# Patient Record
Sex: FEMALE | Race: BLACK OR AFRICAN AMERICAN | Employment: FULL TIME | URBAN - METROPOLITAN AREA
[De-identification: names, ages, dates, MRNs, and addresses within clinical notes are randomized per-mention and may not be internally consistent; named-entity substitution may affect disease eponyms.]

---

## 2017-02-01 PROBLEM — Z98.890 STATUS POST INDUCTION OF LABOR: Status: ACTIVE | Noted: 2017-02-01

## 2017-02-02 ENCOUNTER — ANESTHESIA EVENT (OUTPATIENT)
Dept: LABOR AND DELIVERY | Age: 37
End: 2017-02-02
Payer: COMMERCIAL

## 2017-02-02 ENCOUNTER — ANESTHESIA (OUTPATIENT)
Dept: LABOR AND DELIVERY | Age: 37
End: 2017-02-02
Payer: COMMERCIAL

## 2017-02-02 PROCEDURE — 74011000250 HC RX REV CODE- 250

## 2017-02-02 PROCEDURE — 74011250636 HC RX REV CODE- 250/636

## 2017-02-02 PROCEDURE — 74011250636 HC RX REV CODE- 250/636: Performed by: OBSTETRICS & GYNECOLOGY

## 2017-02-02 RX ORDER — FENTANYL CITRATE 50 UG/ML
INJECTION, SOLUTION INTRAMUSCULAR; INTRAVENOUS AS NEEDED
Status: DISCONTINUED | OUTPATIENT
Start: 2017-02-02 | End: 2017-02-02 | Stop reason: HOSPADM

## 2017-02-02 RX ORDER — KETOROLAC TROMETHAMINE 30 MG/ML
INJECTION, SOLUTION INTRAMUSCULAR; INTRAVENOUS AS NEEDED
Status: DISCONTINUED | OUTPATIENT
Start: 2017-02-02 | End: 2017-02-02 | Stop reason: HOSPADM

## 2017-02-02 RX ORDER — MORPHINE SULFATE 0.5 MG/ML
INJECTION, SOLUTION EPIDURAL; INTRATHECAL; INTRAVENOUS AS NEEDED
Status: DISCONTINUED | OUTPATIENT
Start: 2017-02-02 | End: 2017-02-02 | Stop reason: HOSPADM

## 2017-02-02 RX ORDER — SODIUM CHLORIDE, SODIUM LACTATE, POTASSIUM CHLORIDE, CALCIUM CHLORIDE 600; 310; 30; 20 MG/100ML; MG/100ML; MG/100ML; MG/100ML
INJECTION, SOLUTION INTRAVENOUS
Status: DISCONTINUED | OUTPATIENT
Start: 2017-02-02 | End: 2017-02-02 | Stop reason: HOSPADM

## 2017-02-02 RX ORDER — OXYTOCIN/RINGER'S LACTATE 20/1000 ML
PLASTIC BAG, INJECTION (ML) INTRAVENOUS
Status: DISCONTINUED | OUTPATIENT
Start: 2017-02-02 | End: 2017-02-02 | Stop reason: HOSPADM

## 2017-02-02 RX ORDER — LIDOCAINE HYDROCHLORIDE AND EPINEPHRINE 15; 5 MG/ML; UG/ML
INJECTION, SOLUTION EPIDURAL AS NEEDED
Status: DISCONTINUED | OUTPATIENT
Start: 2017-02-02 | End: 2017-02-02 | Stop reason: HOSPADM

## 2017-02-02 RX ORDER — BUPIVACAINE HYDROCHLORIDE 2.5 MG/ML
INJECTION, SOLUTION EPIDURAL; INFILTRATION; INTRACAUDAL AS NEEDED
Status: DISCONTINUED | OUTPATIENT
Start: 2017-02-02 | End: 2017-02-02 | Stop reason: HOSPADM

## 2017-02-02 RX ORDER — LIDOCAINE HYDROCHLORIDE AND EPINEPHRINE 20; 5 MG/ML; UG/ML
INJECTION, SOLUTION EPIDURAL; INFILTRATION; INTRACAUDAL; PERINEURAL AS NEEDED
Status: DISCONTINUED | OUTPATIENT
Start: 2017-02-02 | End: 2017-02-02 | Stop reason: HOSPADM

## 2017-02-02 RX ADMIN — LIDOCAINE HYDROCHLORIDE AND EPINEPHRINE 5 ML: 20; 5 INJECTION, SOLUTION EPIDURAL; INFILTRATION; INTRACAUDAL; PERINEURAL at 17:49

## 2017-02-02 RX ADMIN — SODIUM CHLORIDE, SODIUM LACTATE, POTASSIUM CHLORIDE, CALCIUM CHLORIDE: 600; 310; 30; 20 INJECTION, SOLUTION INTRAVENOUS at 18:10

## 2017-02-02 RX ADMIN — MORPHINE SULFATE 4 MG: 0.5 INJECTION, SOLUTION EPIDURAL; INTRATHECAL; INTRAVENOUS at 18:31

## 2017-02-02 RX ADMIN — FENTANYL CITRATE 100 MCG: 50 INJECTION, SOLUTION INTRAMUSCULAR; INTRAVENOUS at 07:15

## 2017-02-02 RX ADMIN — BUPIVACAINE HYDROCHLORIDE 2 ML: 2.5 INJECTION, SOLUTION EPIDURAL; INFILTRATION; INTRACAUDAL at 07:18

## 2017-02-02 RX ADMIN — BUPIVACAINE HYDROCHLORIDE 4 ML: 2.5 INJECTION, SOLUTION EPIDURAL; INFILTRATION; INTRACAUDAL at 07:16

## 2017-02-02 RX ADMIN — ONDANSETRON 4 MG: 2 INJECTION INTRAMUSCULAR; INTRAVENOUS at 18:16

## 2017-02-02 RX ADMIN — LIDOCAINE HYDROCHLORIDE AND EPINEPHRINE 5 ML: 20; 5 INJECTION, SOLUTION EPIDURAL; INFILTRATION; INTRACAUDAL; PERINEURAL at 17:51

## 2017-02-02 RX ADMIN — LIDOCAINE HYDROCHLORIDE AND EPINEPHRINE 3 ML: 15; 5 INJECTION, SOLUTION EPIDURAL at 07:14

## 2017-02-02 RX ADMIN — Medication: at 18:30

## 2017-02-02 RX ADMIN — KETOROLAC TROMETHAMINE 30 MG: 30 INJECTION, SOLUTION INTRAMUSCULAR; INTRAVENOUS at 18:57

## 2017-02-02 RX ADMIN — LIDOCAINE HYDROCHLORIDE AND EPINEPHRINE 5 ML: 20; 5 INJECTION, SOLUTION EPIDURAL; INFILTRATION; INTRACAUDAL; PERINEURAL at 17:53

## 2017-02-02 RX ADMIN — MORPHINE SULFATE 1 MG: 0.5 INJECTION, SOLUTION EPIDURAL; INTRATHECAL; INTRAVENOUS at 18:33

## 2017-02-02 NOTE — ANESTHESIA PROCEDURE NOTES
Epidural Block    Start time: 2/2/2017 7:07 AM  End time: 2/2/2017 7:18 AM  Performed by: Noelle Henriquez  Authorized by: Noelle Henriquez     Pre-Procedure  Indication: labor epidural    Preanesthetic Checklist: patient identified, risks and benefits discussed, anesthesia consent, patient being monitored, timeout performed and anesthesia consent    Timeout Time: 07:18        Epidural:   Patient position:  Left lateral decubitus  Prep region:  Lumbar  Prep: Chlorhexidine    Location:  L2-3    Needle and Epidural Catheter:   Needle Type:  Tuohy  Needle Gauge:  17 G  Injection Technique:  Loss of resistance using air  Attempts:  1  Catheter Size:  19 G  Events: no blood with aspiration, no cerebrospinal fluid with aspiration, no paresthesia and negative aspiration test    Test Dose:  Bupivacaine 0.25% and negative    Assessment:   Catheter Secured:  Tegaderm and tape  Insertion:  Uncomplicated  Patient tolerance:  Patient tolerated the procedure well with no immediate complications

## 2017-02-02 NOTE — ANESTHESIA PREPROCEDURE EVALUATION
Anesthetic History   No history of anesthetic complications            Review of Systems / Medical History  Patient summary reviewed, nursing notes reviewed and pertinent labs reviewed    Pulmonary  Within defined limits                 Neuro/Psych   Within defined limits           Cardiovascular  Within defined limits                     GI/Hepatic/Renal  Within defined limits              Endo/Other  Within defined limits           Other Findings              Physical Exam    Airway  Mallampati: II  TM Distance: > 6 cm  Neck ROM: normal range of motion   Mouth opening: Normal     Cardiovascular  Regular rate and rhythm,  S1 and S2 normal,  no murmur, click, rub, or gallop             Dental  No notable dental hx       Pulmonary  Breath sounds clear to auscultation               Abdominal  GI exam deferred       Other Findings            Anesthetic Plan    ASA: 2  Anesthesia type: epidural          Induction: Intravenous  Anesthetic plan and risks discussed with: Patient

## 2017-02-03 NOTE — ANESTHESIA POSTPROCEDURE EVALUATION
Post-Anesthesia Evaluation and Assessment    Patient: Jacklyn Joe MRN: [id number]  SSN: [id number]    YOB: 1980  Age: 39 y.o. Sex: female       Cardiovascular Function/Vital Signs  Visit Vitals    /70 (BP 1 Location: Right arm)    Pulse 96    Temp 36.3 °C (97.4 °F)    Resp 14    Ht 5' 2\" (1.575 m)    Wt 49 kg (108 lb)    SpO2 98%    Breastfeeding Unknown    BMI 19.75 kg/m2       Patient is status post epidural anesthesia for Procedure(s):   SECTION. Nausea/Vomiting: None    Postoperative hydration reviewed and adequate. Pain:  Pain Scale 1: Labor Algorithm/Pain Intensity (17 1700)  Pain Intensity 1: 4 (17 0724)   Managed    Neurological Status:   Neuro (WDL): Within Defined Limits (17 0137)   Block resolving    Mental Status and Level of Consciousness: Alert and oriented     Pulmonary Status:   O2 Device: Room air (17 1910)   Adequate oxygenation and airway patent    Complications related to anesthesia: None    Post-anesthesia assessment completed.  No concerns    Signed By: Antonio Thornton DO     2017

## 2017-02-05 PROBLEM — Z98.890 STATUS POST INDUCTION OF LABOR: Status: RESOLVED | Noted: 2017-02-01 | Resolved: 2017-02-05

## 2019-07-26 ENCOUNTER — OFFICE VISIT (OUTPATIENT)
Dept: PRIMARY CARE CLINIC | Age: 39
End: 2019-07-26

## 2019-07-26 VITALS
DIASTOLIC BLOOD PRESSURE: 99 MMHG | SYSTOLIC BLOOD PRESSURE: 151 MMHG | HEART RATE: 81 BPM | HEIGHT: 62 IN | OXYGEN SATURATION: 98 % | WEIGHT: 138.4 LBS | RESPIRATION RATE: 17 BRPM | BODY MASS INDEX: 25.47 KG/M2 | TEMPERATURE: 98.9 F

## 2019-07-26 DIAGNOSIS — R03.0 ELEVATED BLOOD PRESSURE READING: ICD-10-CM

## 2019-07-26 DIAGNOSIS — L70.9 ACNE, UNSPECIFIED ACNE TYPE: Primary | ICD-10-CM

## 2019-07-26 RX ORDER — GLUCOSAMINE/CHONDR SU A SOD 750-600 MG
TABLET ORAL
COMMUNITY

## 2019-07-26 RX ORDER — CLINDAMYCIN AND BENZOYL PEROXIDE 10; 50 MG/G; MG/G
GEL TOPICAL DAILY
Qty: 1 TUBE | Refills: 0 | Status: SHIPPED | OUTPATIENT
Start: 2019-07-26

## 2019-07-26 RX ORDER — ADAPALENE 0.1 G/100G
CREAM TOPICAL
Qty: 45 G | Refills: 0 | Status: SHIPPED | OUTPATIENT
Start: 2019-07-26

## 2019-07-26 NOTE — PROGRESS NOTES
[facility]   [address]  P: [phone]  F: [phone]      Chief Complaint   Patient presents with    Skin Problem     states that she noticed for the last month she has been really having breakout on the face maybe two months thinks it may be stress. Jason Martínez is a 43 y.o. female who presents to clinic for Skin Problem (states that she noticed for the last month she has been really having breakout on the face maybe two months thinks it may be stress. ). HPI:    [facility] is a 76-year-old female who presents today for worsening of acne. She has previously used BenzaClin with limited success, and is requesting a tretinoin cream today. She endorses acne specifically to her right jawline over the last 1 to 2 weeks. She thinks it is related to stress  from a new job and she just put her daughter in . She is going on a trip soon to [place] which she thinks will help with her stress level. There are no active problems to display for this patient. Past Medical History:   Diagnosis Date    Kidney disease     2013     Past Surgical History:   Procedure Laterality Date    HX OTHER SURGICAL      [place] teeth removal (1994)    HX WISDOM TEETH EXTRACTION       Social History     Socioeconomic History    Marital status:      Spouse name: Not on file    Number of children: Not on file    Years of education: Not on file    Highest education level: Not on file   Occupational History    Not on file   Social Needs    Financial resource strain: Not on file    Food insecurity:     Worry: Not on file     Inability: Not on file    Transportation needs:     Medical: Not on file     Non-medical: Not on file   Tobacco Use    Smoking status: Never Smoker    Smokeless tobacco: Never Used   Substance and Sexual Activity    Alcohol use:  Yes    Drug use: No    Sexual activity: Yes     Partners: Male   Lifestyle    Physical activity:     Days per week: Not on file     Minutes per session: Not on file    Stress: Not on file   Relationships    Social connections:     Talks on phone: Not on file     Gets together: Not on file     Attends Zoroastrian service: Not on file     Active member of club or organization: Not on file     Attends meetings of clubs or organizations: Not on file     Relationship status: Not on file    Intimate partner violence:     Fear of current or ex partner: Not on file     Emotionally abused: Not on file     Physically abused: Not on file     Forced sexual activity: Not on file   Other Topics Concern    Not on file   Social History Narrative    Not on file     History reviewed. No pertinent family history. No Known Allergies    Current Outpatient Medications   Medication Sig Dispense Refill    Biotin 2,500 mcg cap Take  by mouth.  adapalene (DIFFERIN) 0.1 % topical cream Apply  to affected area nightly. use small amount as directed 45 g 0    clindamycin-benzoyl peroxide (BENZACLIN) 1-5 % topical gel Apply  to affected area daily. Apply to affected area after the skin has been cleansed and dried. 1 Tube 0    PNV no.24-iron-folic acid-dha (PRENATAL DHA+COMPLETE PRENATAL) -300 mg-mcg-mg cmpk Take  by mouth.  HYDROcodone-acetaminophen (NORCO) 5-325 mg per tablet Take 1 Tab by mouth every six (6) hours as needed for Pain. Max Daily Amount: 4 Tabs. 24 Tab 0    ibuprofen (MOTRIN) 600 mg tablet Take 1 Tab by mouth every six (6) hours as needed for Pain. 36 Tab 2    ferrous sulfate (IRON) 325 mg (65 mg iron) tablet Take 325 mg by mouth Daily (before breakfast).  multivitamin (ONE A DAY) tablet Take 1 Tab by mouth daily. The medications were reviewed and updated in the medical record. The past medical history, past surgical history, and family history were reviewed and updated in the medical record.     REVIEW OF SYSTEMS   Review of Systems   Constitutional: Negative for malaise/fatigue. HENT: Negative for congestion. Eyes: Negative for blurred vision and pain. Respiratory: Negative for cough and shortness of breath. Cardiovascular: Negative for chest pain and palpitations. Gastrointestinal: Negative for abdominal pain and heartburn. Genitourinary: Negative for frequency and urgency. Musculoskeletal: Negative for joint pain and myalgias. Skin: Positive for rash. Neurological: Negative for dizziness, tingling, sensory change, weakness and headaches. Psychiatric/Behavioral: Negative for depression, memory loss and substance abuse. The patient is nervous/anxious. PHYSICAL EXAM     Visit Vitals  BP (!) 151/99 (BP 1 Location: Left arm, BP Patient Position: Sitting)   Pulse 81   Temp 98.9 °F (37.2 °C)   Resp 17   Ht 5' 2\" (1.575 m)   Wt 138 lb 6.4 oz (62.8 kg)   LMP 07/05/2019   SpO2 98%   BMI 25.31 kg/m²       Physical Exam   Constitutional: She is oriented to person, place, and time and well-developed, well-nourished, and in no distress. Hypertensive, appears anxious   HENT:   Head: Normocephalic and atraumatic. Right Ear: External ear normal.   Left Ear: External ear normal.   Cardiovascular: Normal rate, regular rhythm and normal heart sounds. Pulmonary/Chest: Effort normal and breath sounds normal.   Musculoskeletal: Normal range of motion. She exhibits no edema. Neurological: She is alert and oriented to person, place, and time. Gait normal.   Skin: Skin is warm and dry. Rash noted. Pustular acne to right jawline and forehead   Psychiatric: Affect and judgment normal.   Nursing note and vitals reviewed. ASSESSMENT/ PLAN   Diagnoses and all orders for this visit:    1. Acne, unspecified acne type  -     adapalene (DIFFERIN) 0.1 % topical cream; Apply  to affected area nightly. use small amount as directed  -     clindamycin-benzoyl peroxide (BENZACLIN) 1-5 % topical gel; Apply  to affected area daily.  Apply to affected area after the skin has been cleansed and dried. 2. Elevated blood pressure reading      - Return to office if blood pressure remains elevated over 140/90. Disclaimer:  Advised patient to call back or return to office if symptoms worsen/change/persist.  Discussed expected course/resolution/complications of diagnosis in detail with patient.     Medication risks/benefits/alternatives discussed with patient. Patient was given an after visit summary which includes diagnoses, current medications, & vitals.      Discussed patient instructions and advised to read to all patient instructions regarding care.      Patient expressed understanding with the diagnosis and plan. This note will not be viewable in [address]         Andrei Marino NP  7/26/2019        (This document has been electronically signed)

## 2019-07-26 NOTE — PROGRESS NOTES
Chief Complaint   Patient presents with    Skin Problem     states that she noticed for the last month she has been really having breakout on the face maybe two months thinks it may be stress.

## 2020-02-25 ENCOUNTER — OFFICE VISIT (OUTPATIENT)
Dept: PRIMARY CARE CLINIC | Age: 40
End: 2020-02-25

## 2020-02-25 VITALS
SYSTOLIC BLOOD PRESSURE: 145 MMHG | TEMPERATURE: 99.1 F | HEIGHT: 62 IN | OXYGEN SATURATION: 100 % | RESPIRATION RATE: 16 BRPM | DIASTOLIC BLOOD PRESSURE: 88 MMHG | HEART RATE: 102 BPM | WEIGHT: 141 LBS | BODY MASS INDEX: 25.95 KG/M2

## 2020-02-25 DIAGNOSIS — J20.9 BRONCHITIS, ACUTE, WITH BRONCHOSPASM: Primary | ICD-10-CM

## 2020-02-25 DIAGNOSIS — R68.89 FLU-LIKE SYMPTOMS: ICD-10-CM

## 2020-02-25 DIAGNOSIS — R03.0 ELEVATED BLOOD PRESSURE READING WITHOUT DIAGNOSIS OF HYPERTENSION: ICD-10-CM

## 2020-02-25 LAB
FLUAV+FLUBV AG NOSE QL IA.RAPID: NEGATIVE POS/NEG
FLUAV+FLUBV AG NOSE QL IA.RAPID: NEGATIVE POS/NEG
VALID INTERNAL CONTROL?: YES

## 2020-02-25 RX ORDER — BENZONATATE 100 MG/1
CAPSULE ORAL
COMMUNITY
Start: 2020-02-22

## 2020-02-25 RX ORDER — AZITHROMYCIN 250 MG/1
TABLET, FILM COATED ORAL
Qty: 6 TAB | Refills: 0 | Status: SHIPPED | OUTPATIENT
Start: 2020-02-25 | End: 2020-03-01

## 2020-02-25 RX ORDER — METHYLPREDNISOLONE 4 MG/1
TABLET ORAL
Qty: 1 DOSE PACK | Refills: 0 | Status: SHIPPED | OUTPATIENT
Start: 2020-02-25 | End: 2021-05-03 | Stop reason: ALTCHOICE

## 2020-02-25 NOTE — PROGRESS NOTES
Written by Brenda Leslie, as dictated by Dr. Sandra Givens MD.    Jack Tidwell is a 35 y.o. female. HPI  The patient presents today c/o flu-like symptoms. She reports body aches, productive cough with yellow phlegm, sore throat and general malaise. The symptoms started Thursday, 02/20/2020, but were worse on Friday, 02/21/2020 and yesterday night. She note her daughter, who goes to Day Care, was recently sick with an ear infection, and was treated with Amoxicillin. She went to [address] [place] [address] on 02/22/2020 for her symptoms, where her strep test was negative, and was given Tessalon perles, but the cough did not improve. She has been taking Advil for the pain. BP is high today at 145/88. Her readings while at [address] [place] [address] was 183/113 and 176/110, and she was advised to follow up with her PCP. She has been taking her blood pressure at home which was elevated as well. She reports F hx of hypertension (mother). She is taking prenatal vitamins intermittently, but is not taking Iron Tablets. Her mammogram was done in 09/2019, and pap smear was done in 06/2019, which were both normal.       Patient Active Problem List   Diagnosis Code   (none) - all problems resolved or deleted        Current Outpatient Medications on File Prior to Visit   Medication Sig Dispense Refill    benzonatate (TESSALON) 100 mg capsule       PNV no.24-iron-folic acid-dha (PRENATAL DHA+COMPLETE PRENATAL) -300 mg-mcg-mg cmpk Take  by mouth.  Biotin 2,500 mcg cap Take  by mouth.  adapalene (DIFFERIN) 0.1 % topical cream Apply  to affected area nightly. use small amount as directed 45 g 0    clindamycin-benzoyl peroxide (BENZACLIN) 1-5 % topical gel Apply  to affected area daily. Apply to affected area after the skin has been cleansed and dried. 1 Tube 0    HYDROcodone-acetaminophen (NORCO) 5-325 mg per tablet Take 1 Tab by mouth every six (6) hours as needed for Pain.  Max Daily Amount: 4 Tabs. 24 Tab 0    ibuprofen (MOTRIN) 600 mg tablet Take 1 Tab by mouth every six (6) hours as needed for Pain. 36 Tab 2    ferrous sulfate (IRON) 325 mg (65 mg iron) tablet Take 325 mg by mouth Daily (before breakfast).  multivitamin (ONE A DAY) tablet Take 1 Tab by mouth daily. No current facility-administered medications on file prior to visit. No Known Allergies    Past Medical History:   Diagnosis Date    Kidney disease     2013       Past Surgical History:   Procedure Laterality Date    HX OTHER SURGICAL      [place] teeth removal (1994)    HX WISDOM TEETH EXTRACTION         No family history on file. Social History     Socioeconomic History    Marital status:      Spouse name: Not on file    Number of children: Not on file    Years of education: Not on file    Highest education level: Not on file   Occupational History    Not on file   Social Needs    Financial resource strain: Not on file    Food insecurity:     Worry: Not on file     Inability: Not on file    Transportation needs:     Medical: Not on file     Non-medical: Not on file   Tobacco Use    Smoking status: Never Smoker    Smokeless tobacco: Never Used   Substance and Sexual Activity    Alcohol use:  Yes    Drug use: No    Sexual activity: Yes     Partners: Male   Lifestyle    Physical activity:     Days per week: Not on file     Minutes per session: Not on file    Stress: Not on file   Relationships    Social connections:     Talks on phone: Not on file     Gets together: Not on file     Attends Taoism service: Not on file     Active member of club or organization: Not on file     Attends meetings of clubs or organizations: Not on file     Relationship status: Not on file    Intimate partner violence:     Fear of current or ex partner: Not on file     Emotionally abused: Not on file     Physically abused: Not on file     Forced sexual activity: Not on file   Other Topics Concern    Not on file Social History Narrative    Not on file       No visits with results within 3 Month(s) from this visit. Latest known visit with results is:   Admission on 02/01/2017, Discharged on 02/05/2017   Component Date Value Ref Range Status    WBC 02/01/2017 6.4  3.6 - 11.0 K/uL Final    RBC 02/01/2017 4.01  3.80 - 5.20 M/uL Final    HGB 02/01/2017 12.5  11.5 - 16.0 g/dL Final    HCT 02/01/2017 37.1  35.0 - 47.0 % Final    MCV 02/01/2017 92.5  80.0 - 99.0 FL Final    MCH 02/01/2017 31.2  26.0 - 34.0 PG Final    MCHC 02/01/2017 33.7  30.0 - 36.5 g/dL Final    RDW 02/01/2017 13.4  11.5 - 14.5 % Final    PLATELET 17/94/8956 712  150 - 400 K/uL Final    Antibody screen, External 07/01/2016 Negative   Final    Chlamydia, External 07/01/2016 Negative   Final    ABO,Rh 07/01/2016 AB  Positive   Final    verified by SANJEEV Bhakta, RN    HIV, External 07/01/2016 Non-Reactive   Final    T.  Pallidum Antibody, External 07/01/2016 Negatve   Final    HBsAg, External 07/01/2016 Negative   Final    Rubella, External 07/01/2016 4.61 (Immune)   Final    Gonorrhea, External 07/01/2016 Negative   Final    GrBStrep, External 01/03/2017 Negative   Final    Sodium 02/01/2017 138  136 - 145 mmol/L Final    Potassium 02/01/2017 4.0  3.5 - 5.1 mmol/L Final    Chloride 02/01/2017 106  97 - 108 mmol/L Final    CO2 02/01/2017 20* 21 - 32 mmol/L Final    Anion gap 02/01/2017 12  5 - 15 mmol/L Final    Glucose 02/01/2017 82  65 - 100 mg/dL Final    BUN 02/01/2017 10  6 - 20 MG/DL Final    Creatinine 02/01/2017 0.70  0.55 - 1.02 MG/DL Final    BUN/Creatinine ratio 02/01/2017 14  12 - 20   Final    GFR est AA 02/01/2017 >60  >60 ml/min/1.73m2 Final    GFR est non-AA 02/01/2017 >60  >60 ml/min/1.73m2 Final    Comment: Estimated GFR is calculated using the IDMS-traceable Modification of Diet in Renal Disease (MDRD) Study equation, reported for both  Americans (GFRAA) and non- Americans (GFRNA), and normalized to 1.73m2 body surface area. The physician must decide which value applies to the patient. The MDRD study equation should only be used in individuals age 25 or older. It has not been validated for the following: pregnant women, patients with serious comorbid conditions, or on certain medications, or persons with extremes of body size, muscle mass, or nutritional status.  Calcium 02/01/2017 8.9  8.5 - 10.1 MG/DL Final    Bilirubin, total 02/01/2017 0.2  0.2 - 1.0 MG/DL Final    ALT (SGPT) 02/01/2017 26  12 - 78 U/L Final    AST (SGOT) 02/01/2017 33  15 - 37 U/L Final    Alk. phosphatase 02/01/2017 194* 45 - 117 U/L Final    Protein, total 02/01/2017 6.6  6.4 - 8.2 g/dL Final    Albumin 02/01/2017 3.1* 3.5 - 5.0 g/dL Final    Globulin 02/01/2017 3.5  2.0 - 4.0 g/dL Final    A-G Ratio 02/01/2017 0.9* 1.1 - 2.2   Final    LD 02/01/2017 256* 81 - 246 U/L Final    Uric acid 02/01/2017 4.6  2.6 - 6.0 MG/DL Final    WBC 02/03/2017 18.8* 3.6 - 11.0 K/uL Final    RBC 02/03/2017 3.52* 3.80 - 5.20 M/uL Final    HGB 02/03/2017 11.0* 11.5 - 16.0 g/dL Final    HCT 02/03/2017 32.9* 35.0 - 47.0 % Final    MCV 02/03/2017 93.5  80.0 - 99.0 FL Final    MCH 02/03/2017 31.3  26.0 - 34.0 PG Final    MCHC 02/03/2017 33.4  30.0 - 36.5 g/dL Final    RDW 02/03/2017 13.6  11.5 - 14.5 % Final    PLATELET 66/39/9192 074  150 - 400 K/uL Final    NEUTROPHILS 02/03/2017 89* 32 - 75 % Final    LYMPHOCYTES 02/03/2017 6* 12 - 49 % Final    MONOCYTES 02/03/2017 5  5 - 13 % Final    EOSINOPHILS 02/03/2017 0  0 - 7 % Final    BASOPHILS 02/03/2017 0  0 - 1 % Final    ABS. NEUTROPHILS 02/03/2017 16.7* 1.8 - 8.0 K/UL Final    ABS. LYMPHOCYTES 02/03/2017 1.1  0.8 - 3.5 K/UL Final    ABS. MONOCYTES 02/03/2017 1.0  0.0 - 1.0 K/UL Final    ABS. EOSINOPHILS 02/03/2017 0.0  0.0 - 0.4 K/UL Final    ABS. BASOPHILS 02/03/2017 0.0  0.0 - 0.1 K/UL Final       Review of Systems   Constitutional: Positive for malaise/fatigue.  Negative for weight loss. HENT: Positive for congestion and sore throat. Negative for hearing loss. Eyes: Negative for blurred vision and photophobia. Respiratory: Positive for cough and sputum production (yellow). Negative for shortness of breath. Musculoskeletal: Positive for myalgias. Negative for joint pain. Neurological: Negative for dizziness and headaches. Psychiatric/Behavioral: Negative for depression and substance abuse. The patient is not nervous/anxious and does not have insomnia. Visit Vitals  /88   Pulse (!) 102   Temp 99.1 °F (37.3 °C) (Oral)   Resp 16   Ht 5' 2\" (1.575 m)   Wt 141 lb (64 kg)   SpO2 100%   BMI 25.79 kg/m²       Physical Exam  Vitals signs and nursing note reviewed. Constitutional:       General: She is not in acute distress. Appearance: Normal appearance. She is well-developed, well-groomed and overweight. She is not diaphoretic. HENT:      Head: Normocephalic and atraumatic. Right Ear: Tympanic membrane, ear canal and external ear normal.      Left Ear: Tympanic membrane, ear canal and external ear normal.      Nose:      Right Sinus: No maxillary sinus tenderness. Left Sinus: No maxillary sinus tenderness. Mouth/Throat:      Mouth: Mucous membranes are moist.      Pharynx: Oropharynx is clear. Posterior oropharyngeal erythema present. Eyes:      General:         Right eye: No discharge. Left eye: No discharge. Conjunctiva/sclera: Conjunctivae normal.      Pupils: Pupils are equal, round, and reactive to light. Neck:      Musculoskeletal: Normal range of motion and neck supple. Cardiovascular:      Rate and Rhythm: Normal rate and regular rhythm. Heart sounds: Normal heart sounds. No murmur. No friction rub. No gallop. Pulmonary:      Effort: Pulmonary effort is normal.      Breath sounds: Decreased breath sounds present. No wheezing. Abdominal:      General: Bowel sounds are normal.      Palpations: Abdomen is soft. Tenderness: There is no abdominal tenderness. Musculoskeletal: Normal range of motion. Lymphadenopathy:      Cervical: Cervical adenopathy present. Neurological:      Mental Status: She is alert and oriented to person, place, and time. Deep Tendon Reflexes: Reflexes are normal and symmetric. Psychiatric:         Behavior: Behavior normal.         Thought Content: Thought content normal.         ASSESSMENT and PLAN    ICD-10-CM ICD-9-CM    1. Bronchitis, acute, with bronchospasm J20.9 466.0 methylPREDNISolone (MEDROL DOSEPACK) 4 mg tablet sent to pharmacy. azithromycin (ZITHROMAX) 250 mg tablet sent to pharmacy. Medrol DosePack 4 mg prescribed. Potential side effects were discussed. Pt should take as directed on packaging. Zithromax 250 mg prescribed. Potential side effects were discussed. Pt should take as directed on packaging. 2. Flu-like symptoms R68.89 780.99 AMB POC MER INFLUENZA A/B TEST    POC influenza test performed in office was negative. 3. Elevated blood pressure reading without diagnosis of hypertension R03.0 796.2 Discussed BP may be elevated due to her current illness. Advised patient should monitor her BP at home and record the readings. If they remain elevated, will discuss starting a BP medication. Advised patient to reduce her salt intake. This plan was reviewed with the patient and patient agrees. All questions were answered. This scribe documentation was reviewed by me and accurately reflects the examination and decisions made by me. This note will not be viewable in [address]

## 2020-02-25 NOTE — PROGRESS NOTES
Identified pt with two pt identifiers(name and ). Reviewed record in preparation for visit and have obtained necessary documentation. Chief Complaint   Patient presents with    Flu Like Symptoms     productive cough, sore throat, head congestion since last Thursday; urgent care this past saturday- strep was neg    Elevated Blood Pressure     pt had several elevated BP readings (diastolic in the low 740'Z) @ urgent care , was advised to f/u with PCP to address      Results for orders placed or performed in visit on 20   AMB POC MER INFLUENZA A/B TEST   Result Value Ref Range    VALID INTERNAL CONTROL POC Yes     Influenza A Ag POC Negative Negative Pos/Neg    Influenza B Ag POC Negative Negative Pos/Neg         Health Maintenance Due   Topic    DTaP/Tdap/Td series (1 - Tdap)    PAP AKA CERVICAL CYTOLOGY     Lipid Screen        Coordination of Care Questionnaire:  :   1) Have you been to an emergency room, urgent care, or hospitalized since your last visit? If yes, where when, and reason for visit? yes   - UC last Saturday    2. Have seen or consulted any other health care provider since your last visit? If yes, where when, and reason for visit? NO      Patient is accompanied by self I have received verbal consent from Shara Aguilar to discuss any/all medical information while they are present in the room.

## 2021-05-03 ENCOUNTER — OFFICE VISIT (OUTPATIENT)
Dept: PRIMARY CARE CLINIC | Age: 41
End: 2021-05-03
Payer: COMMERCIAL

## 2021-05-03 VITALS
OXYGEN SATURATION: 100 % | BODY MASS INDEX: 26.46 KG/M2 | HEIGHT: 62 IN | DIASTOLIC BLOOD PRESSURE: 96 MMHG | SYSTOLIC BLOOD PRESSURE: 160 MMHG | TEMPERATURE: 97.8 F | WEIGHT: 143.8 LBS | HEART RATE: 112 BPM | RESPIRATION RATE: 18 BRPM

## 2021-05-03 DIAGNOSIS — R00.0 TACHYCARDIA: ICD-10-CM

## 2021-05-03 DIAGNOSIS — Z12.31 ENCOUNTER FOR SCREENING MAMMOGRAM FOR MALIGNANT NEOPLASM OF BREAST: ICD-10-CM

## 2021-05-03 DIAGNOSIS — Z00.00 PHYSICAL EXAM: Primary | ICD-10-CM

## 2021-05-03 DIAGNOSIS — I10 ESSENTIAL HYPERTENSION: ICD-10-CM

## 2021-05-03 DIAGNOSIS — E55.9 VITAMIN D DEFICIENCY: ICD-10-CM

## 2021-05-03 DIAGNOSIS — Z11.59 NEED FOR HEPATITIS C SCREENING TEST: ICD-10-CM

## 2021-05-03 PROCEDURE — 99396 PREV VISIT EST AGE 40-64: CPT | Performed by: INTERNAL MEDICINE

## 2021-05-03 PROCEDURE — 99213 OFFICE O/P EST LOW 20 MIN: CPT | Performed by: INTERNAL MEDICINE

## 2021-05-03 RX ORDER — BISOPROLOL FUMARATE AND HYDROCHLOROTHIAZIDE 5; 6.25 MG/1; MG/1
1 TABLET ORAL DAILY
Qty: 30 TAB | Refills: 0 | Status: SHIPPED | OUTPATIENT
Start: 2021-05-03 | End: 2021-05-26

## 2021-05-03 NOTE — PROGRESS NOTES
Pili Hall (: 1980) is a 38 y.o. female, established patient, here for evaluation of the following chief complaint(s):  Physical (labs) and Hypertension     Written by Heather Ruano, as dictated by Dr. Samantha Downing MD.    ASSESSMENT/PLAN:  Below is the assessment and plan developed based on review of pertinent history, physical exam, labs, studies, and medications. 1. Physical exam  -     METABOLIC PANEL, COMPREHENSIVE; Future  -     CBC W/O DIFF; Future  -     LIPID PANEL; Future  -     TSH 3RD GENERATION; Future  Complete physical exam done. Basic labs drawn. 2. Essential hypertension  -     bisoprolol-hydroCHLOROthiazide (ZIAC) 5-6.25 mg per tablet; Take 1 Tab by mouth daily for 30 days. , Normal, Disp-30 Tab, R-0 sent to pharmacy. Potential side effects were discussed. I explained to her that elevated blood pressure above 160/90 is in the stroke zone and puts her at high risk for stroke and other complications. I stressed the importance of getting her BP under control immediately and we discussed at lengths the steps we will take moving forward. Close follow up will be required. 3. Tachycardia  -     bisoprolol-hydroCHLOROthiazide (ZIAC) 5-6.25 mg per tablet; Take 1 Tab by mouth daily for 30 days. , Normal, Disp-30 Tab, R-0 sent to pharmacy. Potential side effects were discussed. I prescribed Ziac and explained that it helps to lower BP and heart rate. I instructed pt to check her BP a few time a week for 3 weeks, checking at different times of the day. she should keep a log of her readings and bring it to her f/u appt after 3 weeks. She should also bring her home BP monitor to that appt. 4. Need for hepatitis C screening test  -     HEPATITIS C AB; Future  I ordered a hepatitis C screening for health maintenance.       5. Vitamin D deficiency  -     VITAMIN D, 25 HYDROXY; Future  Check vitamin D.    6. Encounter for screening mammogram for malignant neoplasm of breast  -     ADA 3D EMELYN W MAMMO BI SCREENING INCL CAD; Future  I ordered a mammogram for health maintenance. Additional Comments: I urged her to get the COVID vaccine and explained that it is available at most pharmacies now. SUBJECTIVE/OBJECTIVE:  HPI  The patient comes in fasting today for a complete physical examination and labs. Her BP is elevated today in office at 158/98, 160/96 on manual repeat in L arm while sitting down. She went to the dentist about 2 weeks ago and they told her BP was elevated there as well. Her BP readings at home have been coming back slightly elevated but not as high as it is in office today. She has been working on losing weight but working out and is aiming to get down to 130 lb, so she mentions that this should help her BP. She hs also been cutting back on her salt intake since her dentist appt. She is hesitant to get the COVID vaccine. She cannot recall her last Tdap vaccine but did have her daughter in 2017, so she would likely have gotten it at that time. She follows with gynecology and her last Pap smear and mammogram were 2 years ago. Patient Active Problem List   Diagnosis Code   (none) - all problems resolved or deleted        Current Outpatient Medications on File Prior to Visit   Medication Sig Dispense Refill    benzonatate (TESSALON) 100 mg capsule       ibuprofen (MOTRIN) 600 mg tablet Take 1 Tab by mouth every six (6) hours as needed for Pain. 36 Tab 2    PNV no.24-iron-folic acid-dha (PRENATAL DHA+COMPLETE PRENATAL) -300 mg-mcg-mg cmpk Take  by mouth.  [DISCONTINUED] methylPREDNISolone (MEDROL DOSEPACK) 4 mg tablet As directed 1 Dose Pack 0    Biotin 2,500 mcg cap Take  by mouth.  adapalene (DIFFERIN) 0.1 % topical cream Apply  to affected area nightly. use small amount as directed 45 g 0    clindamycin-benzoyl peroxide (BENZACLIN) 1-5 % topical gel Apply  to affected area daily.  Apply to affected area after the skin has been cleansed and dried. 1 Tube 0    multivitamin (ONE A DAY) tablet Take 1 Tab by mouth daily. No current facility-administered medications on file prior to visit. No Known Allergies    Past Medical History:   Diagnosis Date    Kidney disease     2013       Past Surgical History:   Procedure Laterality Date    HX OTHER SURGICAL      [place] teeth removal (1994)    HX WISDOM TEETH EXTRACTION         No family history on file. Social History     Socioeconomic History    Marital status:      Spouse name: Not on file    Number of children: Not on file    Years of education: Not on file    Highest education level: Not on file   Occupational History    Not on file   Social Needs    Financial resource strain: Not on file    Food insecurity     Worry: Not on file     Inability: Not on file    Transportation needs     Medical: Not on file     Non-medical: Not on file   Tobacco Use    Smoking status: Never Smoker    Smokeless tobacco: Never Used   Substance and Sexual Activity    Alcohol use: Yes    Drug use: No    Sexual activity: Yes     Partners: Male   Lifestyle    Physical activity     Days per week: Not on file     Minutes per session: Not on file    Stress: Not on file   Relationships    Social connections     Talks on phone: Not on file     Gets together: Not on file     Attends Sikh service: Not on file     Active member of club or organization: Not on file     Attends meetings of clubs or organizations: Not on file     Relationship status: Not on file    Intimate partner violence     Fear of current or ex partner: Not on file     Emotionally abused: Not on file     Physically abused: Not on file     Forced sexual activity: Not on file   Other Topics Concern    Not on file   Social History Narrative    Not on file       No visits with results within 3 Month(s) from this visit.    Latest known visit with results is:   Office Visit on 02/25/2020   Component Date Value Ref Range Status    VALID INTERNAL CONTROL POC 02/25/2020 Yes   Final    Influenza A Ag POC 02/25/2020 Negative  Negative Pos/Neg Final    Influenza B Ag POC 02/25/2020 Negative  Negative Pos/Neg Final     Review of Systems   Constitutional: Negative for activity change, fatigue and unexpected weight change. HENT: Negative for congestion, hearing loss, rhinorrhea and sore throat. Eyes: Negative for discharge. Respiratory: Negative for cough, chest tightness and shortness of breath. Cardiovascular: Negative for leg swelling. Gastrointestinal: Negative for abdominal pain, constipation and diarrhea. Genitourinary: Negative for dysuria, flank pain, frequency and urgency. Musculoskeletal: Negative for arthralgias, back pain and myalgias. Skin: Negative for color change and rash. Neurological: Negative for dizziness, light-headedness and headaches. Psychiatric/Behavioral: Negative for dysphoric mood and sleep disturbance. The patient is not nervous/anxious. Visit Vitals  BP (!) 160/96 (BP 1 Location: Left upper arm, BP Patient Position: Sitting)   Pulse (!) 112   Temp 97.8 °F (36.6 °C) (Temporal)   Resp 18   Ht 5' 2\" (1.575 m)   Wt 143 lb 12.8 oz (65.2 kg)   LMP 04/25/2021   SpO2 100%   BMI 26.30 kg/m²     Physical Exam  Vitals signs and nursing note reviewed. Constitutional:       General: She is not in acute distress. Appearance: Normal appearance. She is well-developed and overweight. She is not diaphoretic. HENT:      Right Ear: Tympanic membrane, ear canal and external ear normal.      Left Ear: Tympanic membrane, ear canal and external ear normal.      Mouth/Throat:      Mouth: Mucous membranes are moist.      Pharynx: Oropharynx is clear. Eyes:      General: No scleral icterus. Right eye: No discharge. Left eye: No discharge. Extraocular Movements: Extraocular movements intact.       Conjunctiva/sclera: Conjunctivae normal.   Neck:      Musculoskeletal: Normal range of motion and neck supple. Thyroid: No thyromegaly. Cardiovascular:      Rate and Rhythm: Regular rhythm. Tachycardia present. Pulses: Normal pulses. Dorsalis pedis pulses are 2+ on the right side and 2+ on the left side. Pulmonary:      Effort: Pulmonary effort is normal.      Breath sounds: Normal breath sounds. No wheezing. Abdominal:      General: Bowel sounds are normal. There is no distension. Palpations: Abdomen is soft. Tenderness: There is no abdominal tenderness. Musculoskeletal:      Right lower leg: No edema. Left lower leg: No edema. Comments: B/L knees without crepitus   Lymphadenopathy:      Cervical: No cervical adenopathy. Neurological:      Mental Status: She is alert and oriented to person, place, and time. Deep Tendon Reflexes:      Reflex Scores:       Patellar reflexes are 2+ on the right side and 2+ on the left side. Psychiatric:         Mood and Affect: Mood and affect normal.         An electronic signature was used to authenticate this note.   -- Andrew Arnett

## 2021-05-04 LAB
25(OH)D3+25(OH)D2 SERPL-MCNC: 26.5 NG/ML (ref 30–100)
ALBUMIN SERPL-MCNC: 4.4 G/DL (ref 3.8–4.8)
ALBUMIN/GLOB SERPL: 1.5 {RATIO} (ref 1.2–2.2)
ALP SERPL-CCNC: 67 IU/L (ref 39–117)
ALT SERPL-CCNC: 9 IU/L (ref 0–32)
AST SERPL-CCNC: 19 IU/L (ref 0–40)
BILIRUB SERPL-MCNC: 0.3 MG/DL (ref 0–1.2)
BUN SERPL-MCNC: 11 MG/DL (ref 6–24)
BUN/CREAT SERPL: 17 (ref 9–23)
CALCIUM SERPL-MCNC: 9.1 MG/DL (ref 8.7–10.2)
CHLORIDE SERPL-SCNC: 106 MMOL/L (ref 96–106)
CHOLEST SERPL-MCNC: 170 MG/DL (ref 100–199)
CO2 SERPL-SCNC: 23 MMOL/L (ref 20–29)
CREAT SERPL-MCNC: 0.63 MG/DL (ref 0.57–1)
ERYTHROCYTE [DISTWIDTH] IN BLOOD BY AUTOMATED COUNT: 11.6 % (ref 11.7–15.4)
GLOBULIN SER CALC-MCNC: 3 G/DL (ref 1.5–4.5)
GLUCOSE SERPL-MCNC: 86 MG/DL (ref 65–99)
HCT VFR BLD AUTO: 40.8 % (ref 34–46.6)
HCV AB S/CO SERPL IA: <0.1 S/CO RATIO (ref 0–0.9)
HDLC SERPL-MCNC: 67 MG/DL
HGB BLD-MCNC: 13.5 G/DL (ref 11.1–15.9)
LDLC SERPL CALC-MCNC: 96 MG/DL (ref 0–99)
MCH RBC QN AUTO: 31.5 PG (ref 26.6–33)
MCHC RBC AUTO-ENTMCNC: 33.1 G/DL (ref 31.5–35.7)
MCV RBC AUTO: 95 FL (ref 79–97)
PLATELET # BLD AUTO: 297 X10E3/UL (ref 150–450)
POTASSIUM SERPL-SCNC: 4.2 MMOL/L (ref 3.5–5.2)
PROT SERPL-MCNC: 7.4 G/DL (ref 6–8.5)
RBC # BLD AUTO: 4.28 X10E6/UL (ref 3.77–5.28)
SODIUM SERPL-SCNC: 142 MMOL/L (ref 134–144)
TRIGL SERPL-MCNC: 31 MG/DL (ref 0–149)
TSH SERPL DL<=0.005 MIU/L-ACNC: 1.49 UIU/ML (ref 0.45–4.5)
VLDLC SERPL CALC-MCNC: 7 MG/DL (ref 5–40)
WBC # BLD AUTO: 2.9 X10E3/UL (ref 3.4–10.8)

## 2021-05-11 ENCOUNTER — HOSPITAL ENCOUNTER (OUTPATIENT)
Dept: MAMMOGRAPHY | Age: 41
Discharge: HOME | End: 2021-05-11
Attending: INTERNAL MEDICINE
Payer: COMMERCIAL

## 2021-05-11 DIAGNOSIS — Z12.31 ENCOUNTER FOR SCREENING MAMMOGRAM FOR MALIGNANT NEOPLASM OF BREAST: ICD-10-CM

## 2021-05-11 PROCEDURE — 77063 BREAST TOMOSYNTHESIS BI: CPT

## 2021-05-14 ENCOUNTER — HOSPITAL ENCOUNTER (OUTPATIENT)
Dept: MAMMOGRAPHY | Age: 41
Discharge: HOME | End: 2021-05-14
Payer: COMMERCIAL

## 2021-05-14 DIAGNOSIS — R92.8 ABNORMAL MAMMOGRAM OF LEFT BREAST: ICD-10-CM

## 2021-05-14 PROCEDURE — 77065 DX MAMMO INCL CAD UNI: CPT

## 2021-05-25 ENCOUNTER — OFFICE VISIT (OUTPATIENT)
Dept: PRIMARY CARE CLINIC | Age: 41
End: 2021-05-25
Payer: COMMERCIAL

## 2021-05-25 VITALS
RESPIRATION RATE: 18 BRPM | BODY MASS INDEX: 26.13 KG/M2 | HEART RATE: 103 BPM | HEIGHT: 62 IN | DIASTOLIC BLOOD PRESSURE: 98 MMHG | WEIGHT: 142 LBS | OXYGEN SATURATION: 100 % | SYSTOLIC BLOOD PRESSURE: 165 MMHG | TEMPERATURE: 97 F

## 2021-05-25 DIAGNOSIS — I10 ESSENTIAL HYPERTENSION: Primary | ICD-10-CM

## 2021-05-25 DIAGNOSIS — D72.818 OTHER DECREASED WHITE BLOOD CELL (WBC) COUNT: ICD-10-CM

## 2021-05-25 DIAGNOSIS — E55.9 VITAMIN D DEFICIENCY: ICD-10-CM

## 2021-05-25 PROCEDURE — 99214 OFFICE O/P EST MOD 30 MIN: CPT | Performed by: INTERNAL MEDICINE

## 2021-05-25 NOTE — PROGRESS NOTES
Delio Goldberg (: 1980) is a 38 y.o. female, established patient, here for evaluation of the following chief complaint(s):  Follow-up (discuss lab results)     Written by Dustin Bello, as dictated by Dr. Mariana Raya MD.      ASSESSMENT/PLAN:  Below is the assessment and plan developed based on review of pertinent history, physical exam, labs, studies, and medications. 1. Essential hypertension  I explained to her that elevated blood pressure above 160/90 is in the stroke zone and puts her at high risk for stroke and other complications. I stressed the importance of getting her BP under control immediately and we discussed at lengths the steps we will take moving forward. Close follow up will be required. Explained that the bisoprolol-HCTZ 5-6.25mg prescribed is a low dose and I highly recommend she take this medication. If she continues to have side effects we can discuss changing her medication. 2. Vitamin D deficiency  Recommend OTC vitamin D3 1000 units daily. 3. Other decreased white blood cell (WBC) count  Will recheck CBC in 6 weeks. Can consider referral to hematology if white count continues to be low. SUBJECTIVE/OBJECTIVE:  HPI  The patient presents today to follow-up for HTN and to discuss lab results. She was started on bisoprolol-HCTZ 5-6.25mg at her last appt on 21. She started on this medication, but felt \"out of it,\" so she discontinued the medication. She has been checking her BP at home with good readings and brought her home BP monitor in to the office today to check its accuracy. Her BP today is elevated at 160/98, 165/98 on manual repeat. Her HR is also elevated today at 103. Her labs from 21 shows low vitamin D at 26.5. She is taking a prenatal vitamin, but not a vitamin D supplement. Her CBC from 21 shows low WBC at 2.9. She denies ever being told her white count was low in the past. She has been feeling tired recently.      Patient Active Problem List   Diagnosis Code   (none) - all problems resolved or deleted        Current Outpatient Medications on File Prior to Visit   Medication Sig Dispense Refill    bisoprolol-hydroCHLOROthiazide (ZIAC) 5-6.25 mg per tablet Take 1 Tab by mouth daily for 30 days. 30 Tab 0    benzonatate (TESSALON) 100 mg capsule       Biotin 2,500 mcg cap Take  by mouth.  adapalene (DIFFERIN) 0.1 % topical cream Apply  to affected area nightly. use small amount as directed 45 g 0    clindamycin-benzoyl peroxide (BENZACLIN) 1-5 % topical gel Apply  to affected area daily. Apply to affected area after the skin has been cleansed and dried. 1 Tube 0    ibuprofen (MOTRIN) 600 mg tablet Take 1 Tab by mouth every six (6) hours as needed for Pain. 36 Tab 2    PNV no.24-iron-folic acid-dha (PRENATAL DHA+COMPLETE PRENATAL) -300 mg-mcg-mg cmpk Take  by mouth.  multivitamin (ONE A DAY) tablet Take 1 Tab by mouth daily. No current facility-administered medications on file prior to visit. No Known Allergies    Past Medical History:   Diagnosis Date    Kidney disease     2013       Past Surgical History:   Procedure Laterality Date    HX OTHER SURGICAL      [place] teeth removal (1994)    HX WISDOM TEETH EXTRACTION         No family history on file. Social History     Socioeconomic History    Marital status:      Spouse name: Not on file    Number of children: Not on file    Years of education: Not on file    Highest education level: Not on file   Occupational History    Not on file   Tobacco Use    Smoking status: Never Smoker    Smokeless tobacco: Never Used   Substance and Sexual Activity    Alcohol use:  Yes    Drug use: No    Sexual activity: Yes     Partners: Male   Other Topics Concern    Not on file   Social History Narrative    Not on file     Social Determinants of Health     Financial Resource Strain:     Difficulty of Paying Living Expenses:    Food Insecurity:  Worried About [facility] in the Last Year:    [address] in the Last Year:    Transportation Needs:     Lack of Transportation (Medical):  Lack of Transportation (Non-Medical):    Physical Activity:     Days of Exercise per Week:     Minutes of Exercise per Session:    Stress:     Feeling of Stress :    Social Connections:     Frequency of Communication with Friends and Family:     Frequency of Social Gatherings with Friends and Family:     Attends Yazdanism Services:     Active Member of Clubs or Organizations:     Attends Club or Organization Meetings:     Marital Status:    Intimate Partner Violence:     Fear of Current or Ex-Partner:     Emotionally Abused:     Physically Abused:     Sexually Abused:        Office Visit on 05/03/2021   Component Date Value Ref Range Status    Glucose 05/03/2021 86  65 - 99 mg/dL Final    BUN 05/03/2021 11  6 - 24 mg/dL Final    Creatinine 05/03/2021 0.63  0.57 - 1.00 mg/dL Final    GFR est non-AA 05/03/2021 112  >59 mL/min/1.73 Final    GFR est AA 05/03/2021 129  >59 mL/min/1.73 Final    Comment: **[facility] currently reports eGFR in compliance with the current**    recommendations of the [employer]. [facility] will    update reporting as new guidelines are published from the NKF-ASN    Task force.  BUN/Creatinine ratio 05/03/2021 17  9 - 23 Final    Sodium 05/03/2021 142  134 - 144 mmol/L Final    Potassium 05/03/2021 4.2  3.5 - 5.2 mmol/L Final    Chloride 05/03/2021 106  96 - 106 mmol/L Final    CO2 05/03/2021 23  20 - 29 mmol/L Final    Calcium 05/03/2021 9.1  8.7 - 10.2 mg/dL Final    Protein, total 05/03/2021 7.4  6.0 - 8.5 g/dL Final    Albumin 05/03/2021 4.4  3.8 - 4.8 g/dL Final    GLOBULIN, TOTAL 05/03/2021 3.0  1.5 - 4.5 g/dL Final    A-G Ratio 05/03/2021 1.5  1.2 - 2.2 Final    Bilirubin, total 05/03/2021 0.3  0.0 - 1.2 mg/dL Final    Alk.  phosphatase 05/03/2021 67  39 - 117 IU/L Final    AST (SGOT) 05/03/2021 19  0 - 40 IU/L Final    ALT (SGPT) 05/03/2021 9  0 - 32 IU/L Final    WBC 05/03/2021 2.9* 3.4 - 10.8 x10E3/uL Final    RBC 05/03/2021 4.28  3.77 - 5.28 x10E6/uL Final    HGB 05/03/2021 13.5  11.1 - 15.9 g/dL Final    HCT 05/03/2021 40.8  34.0 - 46.6 % Final    MCV 05/03/2021 95  79 - 97 fL Final    MCH 05/03/2021 31.5  26.6 - 33.0 pg Final    MCHC 05/03/2021 33.1  31.5 - 35.7 g/dL Final    RDW 05/03/2021 11.6* 11.7 - 15.4 % Final    PLATELET 32/22/6879 392  150 - 450 x10E3/uL Final    Cholesterol, total 05/03/2021 170  100 - 199 mg/dL Final    Triglyceride 05/03/2021 31  0 - 149 mg/dL Final    HDL Cholesterol 05/03/2021 67  >39 mg/dL Final    VLDL, calculated 05/03/2021 7  5 - 40 mg/dL Final    LDL, calculated 05/03/2021 96  0 - 99 mg/dL Final    TSH 05/03/2021 1.490  0.450 - 4.500 uIU/mL Final    VITAMIN D, 25-HYDROXY 05/03/2021 26.5* 30.0 - 100.0 ng/mL Final    Comment: Vitamin D deficiency has been defined by the [place] [employer] and an Endocrine Society practice guideline as a  level of serum 25-OH vitamin D less than 20 ng/mL (1,2). The Endocrine Society went on to further define vitamin D  insufficiency as a level between 21 and 29 ng/mL (2). 1. IOM ([place] of Medicine). 2010. Dietary reference     intakes for calcium and D. [facility]: The     [employer]. 2. Carson MF, Marc HERRERA, Fredy LEDESMA, et al.     Evaluation, treatment, and prevention of vitamin D     deficiency: an Endocrine Society clinical practice     guideline. JCEM. 2011 Jul; 96(7):1911-30.  Hep C Virus Ab 05/03/2021 <0.1  0.0 - 0.9 s/co ratio Final    Comment:                                   Negative:     < 0.8                               Indeterminate: 0.8 - 0.9                                    Positive:     > 0.9   The CDC recommends that a positive HCV antibody result   be followed up with a HCV Nucleic Acid Amplification   test (031810).         Review of Systems   Constitutional: Positive for fatigue. Negative for activity change and unexpected weight change. HENT: Negative for congestion, hearing loss, rhinorrhea and sore throat. Eyes: Negative for discharge. Respiratory: Negative for cough, chest tightness and shortness of breath. Cardiovascular: Negative for leg swelling. Gastrointestinal: Negative for abdominal pain, constipation and diarrhea. Genitourinary: Negative for dysuria, flank pain, frequency and urgency. Musculoskeletal: Negative for arthralgias, back pain and myalgias. Skin: Negative for color change and rash. Neurological: Negative for dizziness, light-headedness and headaches. Psychiatric/Behavioral: Negative for dysphoric mood and sleep disturbance. The patient is not nervous/anxious. Visit Vitals  BP (!) 165/98 (BP 1 Location: Left upper arm, BP Patient Position: Sitting)   Pulse (!) 103   Temp 97 °F (36.1 °C) (Temporal)   Resp 18   Ht 5' 2\" (1.575 m)   Wt 142 lb (64.4 kg)   LMP 04/20/2021   SpO2 100%   BMI 25.97 kg/m²      Physical Exam  Vitals and nursing note reviewed. Constitutional:       General: She is not in acute distress. Appearance: Normal appearance. She is well-developed. She is not diaphoretic. HENT:      Right Ear: External ear normal.      Left Ear: External ear normal.   Eyes:      General: No scleral icterus. Right eye: No discharge. Left eye: No discharge. Extraocular Movements: Extraocular movements intact. Conjunctiva/sclera: Conjunctivae normal.   Cardiovascular:      Rate and Rhythm: Normal rate and regular rhythm. Pulmonary:      Effort: Pulmonary effort is normal.      Breath sounds: Normal breath sounds. No wheezing. Abdominal:      General: Bowel sounds are normal.      Palpations: Abdomen is soft. Tenderness: There is no abdominal tenderness. Musculoskeletal:      Cervical back: Normal range of motion and neck supple.    Lymphadenopathy: Cervical: No cervical adenopathy. Neurological:      Mental Status: She is alert and oriented to person, place, and time. Psychiatric:         Mood and Affect: Mood and affect normal.       An electronic signature was used to authenticate this note.   -- Mable Tipton

## 2021-05-26 DIAGNOSIS — R00.0 TACHYCARDIA: ICD-10-CM

## 2021-05-26 DIAGNOSIS — I10 ESSENTIAL HYPERTENSION: ICD-10-CM

## 2021-05-26 RX ORDER — BISOPROLOL FUMARATE AND HYDROCHLOROTHIAZIDE 5; 6.25 MG/1; MG/1
TABLET ORAL
Qty: 30 TABLET | Refills: 0 | Status: SHIPPED | OUTPATIENT
Start: 2021-05-26 | End: 2021-06-19

## 2021-06-19 DIAGNOSIS — I10 ESSENTIAL HYPERTENSION: ICD-10-CM

## 2021-06-19 DIAGNOSIS — R00.0 TACHYCARDIA: ICD-10-CM

## 2021-06-19 RX ORDER — BISOPROLOL FUMARATE AND HYDROCHLOROTHIAZIDE 5; 6.25 MG/1; MG/1
TABLET ORAL
Qty: 30 TABLET | Refills: 0 | Status: SHIPPED | OUTPATIENT
Start: 2021-06-19

## 2022-06-07 ENCOUNTER — TRANSCRIBE ORDER (OUTPATIENT)
Dept: SCHEDULING | Age: 42
End: 2022-06-07

## 2022-06-07 DIAGNOSIS — Z12.31 BREAST CANCER SCREENING BY MAMMOGRAM: Primary | ICD-10-CM

## 2022-07-18 ENCOUNTER — HOSPITAL ENCOUNTER (OUTPATIENT)
Dept: MAMMOGRAPHY | Age: 42
Discharge: HOME | End: 2022-07-18
Attending: INTERNAL MEDICINE
Payer: COMMERCIAL

## 2022-07-18 DIAGNOSIS — Z12.31 BREAST CANCER SCREENING BY MAMMOGRAM: ICD-10-CM

## 2022-07-18 PROCEDURE — 77063 BREAST TOMOSYNTHESIS BI: CPT

## 2023-05-15 DIAGNOSIS — I10 ESSENTIAL (PRIMARY) HYPERTENSION: Primary | ICD-10-CM

## 2023-05-15 DIAGNOSIS — I10 ESSENTIAL (PRIMARY) HYPERTENSION: ICD-10-CM

## 2023-05-15 RX ORDER — AMLODIPINE BESYLATE 5 MG/1
5 TABLET ORAL DAILY
Qty: 90 TABLET | Refills: 0 | Status: SHIPPED | OUTPATIENT
Start: 2023-05-15 | End: 2023-08-13

## 2023-05-15 RX ORDER — AMLODIPINE BESYLATE 5 MG/1
TABLET ORAL
Qty: 30 TABLET | Refills: 1 | Status: SHIPPED | OUTPATIENT
Start: 2023-05-15

## 2023-07-11 ENCOUNTER — TRANSCRIBE ORDERS (OUTPATIENT)
Facility: HOSPITAL | Age: 43
End: 2023-07-11

## 2023-07-11 DIAGNOSIS — Z12.31 VISIT FOR SCREENING MAMMOGRAM: Primary | ICD-10-CM

## 2023-07-22 DIAGNOSIS — I10 ESSENTIAL (PRIMARY) HYPERTENSION: ICD-10-CM

## 2023-07-24 RX ORDER — BISOPROLOL FUMARATE AND HYDROCHLOROTHIAZIDE 5; 6.25 MG/1; MG/1
TABLET ORAL
Qty: 30 TABLET | Refills: 0 | Status: SHIPPED | OUTPATIENT
Start: 2023-07-24 | End: 2023-08-25

## 2023-07-29 ENCOUNTER — HOSPITAL ENCOUNTER (OUTPATIENT)
Facility: HOSPITAL | Age: 43
End: 2023-07-29
Attending: INTERNAL MEDICINE
Payer: COMMERCIAL

## 2023-07-29 DIAGNOSIS — Z12.31 VISIT FOR SCREENING MAMMOGRAM: ICD-10-CM

## 2023-07-29 PROCEDURE — 77063 BREAST TOMOSYNTHESIS BI: CPT

## 2023-08-24 DIAGNOSIS — I10 ESSENTIAL (PRIMARY) HYPERTENSION: ICD-10-CM

## 2023-08-25 RX ORDER — BISOPROLOL FUMARATE AND HYDROCHLOROTHIAZIDE 5; 6.25 MG/1; MG/1
TABLET ORAL
Qty: 90 TABLET | Refills: 1 | Status: SHIPPED | OUTPATIENT
Start: 2023-08-25

## 2024-03-04 DIAGNOSIS — I10 ESSENTIAL (PRIMARY) HYPERTENSION: ICD-10-CM

## 2024-03-04 RX ORDER — BISOPROLOL FUMARATE AND HYDROCHLOROTHIAZIDE 5; 6.25 MG/1; MG/1
TABLET ORAL
Qty: 90 TABLET | Refills: 0 | Status: SHIPPED | OUTPATIENT
Start: 2024-03-04

## 2024-06-16 DIAGNOSIS — I10 ESSENTIAL (PRIMARY) HYPERTENSION: ICD-10-CM

## 2024-06-17 RX ORDER — BISOPROLOL FUMARATE AND HYDROCHLOROTHIAZIDE 5; 6.25 MG/1; MG/1
TABLET ORAL
Qty: 30 TABLET | Refills: 0 | Status: SHIPPED | OUTPATIENT
Start: 2024-06-17

## 2024-07-22 DIAGNOSIS — I10 ESSENTIAL (PRIMARY) HYPERTENSION: ICD-10-CM

## 2024-07-22 RX ORDER — BISOPROLOL FUMARATE AND HYDROCHLOROTHIAZIDE 5; 6.25 MG/1; MG/1
TABLET ORAL
Qty: 30 TABLET | Refills: 0 | Status: SHIPPED | OUTPATIENT
Start: 2024-07-22

## 2024-08-02 ENCOUNTER — HOSPITAL ENCOUNTER (OUTPATIENT)
Facility: HOSPITAL | Age: 44
Discharge: HOME | End: 2024-08-02
Attending: INTERNAL MEDICINE
Payer: COMMERCIAL

## 2024-08-02 DIAGNOSIS — Z12.31 VISIT FOR SCREENING MAMMOGRAM: ICD-10-CM

## 2024-08-02 PROCEDURE — 77063 BREAST TOMOSYNTHESIS BI: CPT

## 2025-08-07 ENCOUNTER — HOSPITAL ENCOUNTER (OUTPATIENT)
Facility: HOSPITAL | Age: 45
Discharge: HOME | End: 2025-08-10
Attending: INTERNAL MEDICINE
Payer: COMMERCIAL

## 2025-08-07 DIAGNOSIS — Z12.31 ENCOUNTER FOR SCREENING MAMMOGRAM FOR MALIGNANT NEOPLASM OF BREAST: ICD-10-CM

## 2025-08-07 PROCEDURE — 77067 SCR MAMMO BI INCL CAD: CPT
